# Patient Record
Sex: FEMALE | URBAN - METROPOLITAN AREA
[De-identification: names, ages, dates, MRNs, and addresses within clinical notes are randomized per-mention and may not be internally consistent; named-entity substitution may affect disease eponyms.]

---

## 2018-01-09 ENCOUNTER — IMPORTED ENCOUNTER (OUTPATIENT)
Dept: URBAN - METROPOLITAN AREA CLINIC 43 | Facility: CLINIC | Age: 68
End: 2018-01-09

## 2018-01-09 PROBLEM — H16.142: Noted: 2018-01-09

## 2018-01-09 PROBLEM — L24.5: Noted: 2018-01-09

## 2018-05-01 ENCOUNTER — IMPORTED ENCOUNTER (OUTPATIENT)
Dept: URBAN - METROPOLITAN AREA CLINIC 43 | Facility: CLINIC | Age: 68
End: 2018-05-01

## 2018-05-01 PROBLEM — H16.223: Noted: 2018-05-01

## 2018-06-27 ENCOUNTER — APPOINTMENT (RX ONLY)
Dept: URBAN - METROPOLITAN AREA CLINIC 124 | Facility: CLINIC | Age: 68
Setting detail: DERMATOLOGY
End: 2018-06-27

## 2018-06-27 DIAGNOSIS — L21.8 OTHER SEBORRHEIC DERMATITIS: ICD-10-CM

## 2018-06-27 PROBLEM — L29.8 OTHER PRURITUS: Status: ACTIVE | Noted: 2018-06-27

## 2018-06-27 PROBLEM — I10 ESSENTIAL (PRIMARY) HYPERTENSION: Status: ACTIVE | Noted: 2018-06-27

## 2018-06-27 PROBLEM — M12.9 ARTHROPATHY, UNSPECIFIED: Status: ACTIVE | Noted: 2018-06-27

## 2018-06-27 PROBLEM — E78.5 HYPERLIPIDEMIA, UNSPECIFIED: Status: ACTIVE | Noted: 2018-06-27

## 2018-06-27 PROBLEM — L23.7 ALLERGIC CONTACT DERMATITIS DUE TO PLANTS, EXCEPT FOOD: Status: ACTIVE | Noted: 2018-06-27

## 2018-06-27 PROCEDURE — ? COUNSELING

## 2018-06-27 PROCEDURE — 99202 OFFICE O/P NEW SF 15 MIN: CPT

## 2018-06-27 PROCEDURE — ? PATIENT SPECIFIC COUNSELING

## 2018-06-27 PROCEDURE — ? PRESCRIPTION

## 2018-06-27 RX ORDER — FLUOCINONIDE 0.5 MG/ML
SOLUTION TOPICAL
Qty: 1 | Refills: 11 | Status: ERX | COMMUNITY
Start: 2018-06-27

## 2018-06-27 RX ADMIN — FLUOCINONIDE: 0.5 SOLUTION TOPICAL at 20:26

## 2018-06-27 ASSESSMENT — LOCATION DETAILED DESCRIPTION DERM: LOCATION DETAILED: HAIR

## 2018-06-27 ASSESSMENT — LOCATION SIMPLE DESCRIPTION DERM: LOCATION SIMPLE: HAIR

## 2018-06-27 ASSESSMENT — LOCATION ZONE DERM: LOCATION ZONE: SCALP

## 2018-06-27 NOTE — HPI: ITCHING (SCALP)
How Did The Scalp Condition Occur?: sudden in onset (over a period of weeks to a few months)
How Severe Is The Scalp Condition?: moderate
Additional History: The patient states she has been showing twice daily and washing her hair four times every time she showers. She uses her fingernails to scrub her scalp and states she must do this repeatedly to get the \"gritty\" particles out of her hair. She denies excessive itch but does experience abnormal sensations of creeping and crawling in the scalp. She has been using a nit comb and uses Rid daily for the past three months. The patient states the gritty particles with land on her shoulders and she can feel them under her feet when she walks. She has to wash her bedding daily due to accumulation at night. Also, she notes accumulation of gritty substance around her eyes every morning.

## 2018-06-27 NOTE — PROCEDURE: MIPS QUALITY
Detail Level: Detailed
Quality 111:Pneumonia Vaccination Status For Older Adults: Hospice services provided to patient any time during the measurement period.
Quality 110: Preventive Care And Screening: Influenza Immunization: Influenza Immunization Administered during Influenza season
Quality 130: Documentation Of Current Medications In The Medical Record: Current Medications Documented

## 2018-06-27 NOTE — PROCEDURE: PATIENT SPECIFIC COUNSELING
Detail Level: Simple
I discussed with the patient very openly that her scalp looks completely normal besides a couple of small excoriations. There is no pathology present and I am concerned for a developing delusions of parasitosis. I discussed this with the patient and reassured her there is no mite/nit infestation present. She was very reassured but would like to collect some of the gritty debris she accumulates and have me evaluate this. I counseled her to use the fluocinonide solution BID x 2 weeks anywhere that she has itch or \"creepy crawly\" sensation. She was instructed to return to clinic for further eval if needed.

## 2020-04-19 ASSESSMENT — VISUAL ACUITY
OS_CC: @ 15"
OD_SC: 20/200
OS_CC: J1+
OS_SC: J2+2
OD_CC: J1+
OD_CC: 20/20
OS_CC: 20/20
OD_CC: @ 15"
OS_CC: 20/20
OS_OTHER: 20/70.
OS_SC: 20/200
OD_CC: 20/20-1
OD_OTHER: 20/40.
OD_SC: J2-

## 2020-04-19 ASSESSMENT — KERATOMETRY
OS_K2POWER_DIOPTERS: 42.5
OS_K1POWER_DIOPTERS: 44.25
OD_AXISANGLE2_DEGREES: 60
OD_K2POWER_DIOPTERS: 43.25
OS_AXISANGLE_DEGREES: 5
OD_K1POWER_DIOPTERS: 44
OS_AXISANGLE2_DEGREES: 95
OD_AXISANGLE_DEGREES: 150

## 2020-04-19 ASSESSMENT — TONOMETRY
OD_IOP_MMHG: 13.0
OS_IOP_MMHG: 16.0
OD_IOP_MMHG: 11.0
OS_IOP_MMHG: 14.0

## 2024-09-27 ENCOUNTER — COMPREHENSIVE EXAM (OUTPATIENT)
Dept: URBAN - METROPOLITAN AREA CLINIC 32 | Facility: CLINIC | Age: 74
End: 2024-09-27

## 2024-09-27 DIAGNOSIS — H16.223: ICD-10-CM

## 2024-09-27 DIAGNOSIS — H43.813: ICD-10-CM

## 2024-09-27 DIAGNOSIS — H35.3132: ICD-10-CM

## 2024-09-27 DIAGNOSIS — H25.013: ICD-10-CM

## 2024-09-27 DIAGNOSIS — H25.13: ICD-10-CM

## 2024-09-27 PROCEDURE — 92015 DETERMINE REFRACTIVE STATE: CPT

## 2024-09-27 PROCEDURE — 99214 OFFICE O/P EST MOD 30 MIN: CPT

## 2024-12-24 ENCOUNTER — NEW PATIENT (OUTPATIENT)
Age: 74
End: 2024-12-24

## 2024-12-24 VITALS
SYSTOLIC BLOOD PRESSURE: 133 MMHG | HEIGHT: 65 IN | HEART RATE: 69 BPM | BODY MASS INDEX: 31.16 KG/M2 | WEIGHT: 187 LBS | DIASTOLIC BLOOD PRESSURE: 82 MMHG

## 2024-12-24 DIAGNOSIS — G43.B0: ICD-10-CM

## 2024-12-24 DIAGNOSIS — H04.123: ICD-10-CM

## 2024-12-24 DIAGNOSIS — H35.3121: ICD-10-CM

## 2024-12-24 DIAGNOSIS — H43.813: ICD-10-CM

## 2024-12-24 DIAGNOSIS — H25.013: ICD-10-CM

## 2024-12-24 DIAGNOSIS — H25.13: ICD-10-CM

## 2024-12-24 DIAGNOSIS — H35.3112: ICD-10-CM

## 2024-12-24 PROCEDURE — 92250 FUNDUS PHOTOGRAPHY W/I&R: CPT

## 2024-12-24 PROCEDURE — 92004 COMPRE OPH EXAM NEW PT 1/>: CPT

## 2024-12-24 PROCEDURE — 92134 CPTRZ OPH DX IMG PST SGM RTA: CPT
